# Patient Record
Sex: FEMALE | Race: OTHER | NOT HISPANIC OR LATINO | Employment: FULL TIME | URBAN - METROPOLITAN AREA
[De-identification: names, ages, dates, MRNs, and addresses within clinical notes are randomized per-mention and may not be internally consistent; named-entity substitution may affect disease eponyms.]

---

## 2022-07-24 ENCOUNTER — OFFICE VISIT (OUTPATIENT)
Dept: URGENT CARE | Facility: MEDICAL CENTER | Age: 37
End: 2022-07-24
Payer: COMMERCIAL

## 2022-07-24 VITALS
HEIGHT: 68 IN | HEART RATE: 83 BPM | OXYGEN SATURATION: 100 % | WEIGHT: 148 LBS | SYSTOLIC BLOOD PRESSURE: 156 MMHG | TEMPERATURE: 98.8 F | RESPIRATION RATE: 20 BRPM | BODY MASS INDEX: 22.43 KG/M2 | DIASTOLIC BLOOD PRESSURE: 96 MMHG

## 2022-07-24 DIAGNOSIS — Z88.1 ALLERGIC REACTION TO AMOXICILLIN/CLAVULANIC ACID: Primary | ICD-10-CM

## 2022-07-24 PROCEDURE — 99213 OFFICE O/P EST LOW 20 MIN: CPT | Performed by: PHYSICIAN ASSISTANT

## 2022-07-24 RX ORDER — PREDNISONE 10 MG/1
TABLET ORAL
Qty: 30 TABLET | Refills: 0 | Status: SHIPPED | OUTPATIENT
Start: 2022-07-24

## 2022-07-24 NOTE — PROGRESS NOTES
[facility]        NAME: Nelson Sanots is a 44 y o  female  : 1985    MRN: [id number]  DATE: 2022  TIME: 2:08 PM    Assessment and Plan   Allergic reaction to amoxicillin/clavulanic acid [Z88 1]  1  Allergic reaction to amoxicillin/clavulanic acid  predniSONE 10 mg tablet         Patient Instructions     Follow-up blood pressure check  Follow up with PCP as needed  Claritin 10 mg daily  Howard chart allergic to penicillin  Chief Complaint     Chief Complaint   Patient presents with    Rash     Patient started with a rash on her upper body on Friday  She was on amoxicillin nini  or a tooth issue         History of Present Illness       Patient was on amoxicillin for several days for a tooth infection  She stopped it when the rash started yesterday  The rash has continued and increases with a hot shower  Rash  This is a new problem  The current episode started yesterday  The problem has been gradually worsening since onset  The rash is diffuse  The problem is moderate  The rash is characterized by redness and itchiness  She was exposed to a new medication  Pertinent negatives include no anorexia, congestion, cough, decreased physical activity, decreased responsiveness, decreased sleep, drinking less, diarrhea, facial edema, fatigue, fever, itching, joint pain, rhinorrhea, shortness of breath, sore throat or vomiting  Past treatments include nothing  Review of Systems   Review of Systems   Constitutional: Negative for decreased responsiveness, fatigue and fever  HENT: Negative for congestion, rhinorrhea and sore throat  Respiratory: Negative for cough and shortness of breath  Gastrointestinal: Negative for anorexia, diarrhea and vomiting  Musculoskeletal: Negative for joint pain  Skin: Positive for rash  Negative for itching  All other systems reviewed and are negative          Current Medications       Current Outpatient Medications:     predniSONE 10 mg tablet, 5 x 4 days, 4 x 1, 3 x 1, 2 x 1, 1 x 1, Disp: 30 tablet, Rfl: 0    Current Allergies     Allergies as of 07/24/2022    (No Known Allergies)            The following portions of the patient's history were reviewed and updated as appropriate: allergies, current medications, past family history, past medical history, past social history, past surgical history and problem list      History reviewed  No pertinent past medical history  History reviewed  No pertinent surgical history  History reviewed  No pertinent family history  Medications have been verified  Objective   /96   Pulse 83   Temp 98 8 °F (37 1 °C)   Resp 20   Ht 5' 8" (1 727 m)   Wt 67 1 kg (148 lb)   LMP 07/06/2022   SpO2 100%   BMI 22 50 kg/m²   Patient's last menstrual period was 07/06/2022  Physical Exam     Physical Exam  Vitals and nursing note reviewed  Constitutional:       Appearance: Normal appearance  She is normal weight  Cardiovascular:      Rate and Rhythm: Normal rate and regular rhythm  Pulses: Normal pulses  Heart sounds: Normal heart sounds  Pulmonary:      Effort: Pulmonary effort is normal       Breath sounds: Normal breath sounds  Lymphadenopathy:      Cervical: No cervical adenopathy  Neurological:      Mental Status: She is alert           Widespread macular pruritic rash

## 2023-11-20 ENCOUNTER — OFFICE VISIT (OUTPATIENT)
Dept: URGENT CARE | Facility: MEDICAL CENTER | Age: 38
End: 2023-11-20
Payer: COMMERCIAL

## 2023-11-20 VITALS
DIASTOLIC BLOOD PRESSURE: 85 MMHG | OXYGEN SATURATION: 100 % | HEART RATE: 72 BPM | TEMPERATURE: 99.3 F | HEIGHT: 68 IN | BODY MASS INDEX: 22.5 KG/M2 | RESPIRATION RATE: 16 BRPM | SYSTOLIC BLOOD PRESSURE: 147 MMHG

## 2023-11-20 DIAGNOSIS — H69.93 DYSFUNCTION OF BOTH EUSTACHIAN TUBES: Primary | ICD-10-CM

## 2023-11-20 PROCEDURE — 99213 OFFICE O/P EST LOW 20 MIN: CPT | Performed by: FAMILY MEDICINE

## 2023-11-20 RX ORDER — FLUTICASONE PROPIONATE 50 MCG
2 SPRAY, SUSPENSION (ML) NASAL DAILY
Qty: 16 G | Refills: 0 | Status: SHIPPED | OUTPATIENT
Start: 2023-11-20

## 2023-11-20 RX ORDER — METOPROLOL SUCCINATE 50 MG/1
50 TABLET, EXTENDED RELEASE ORAL DAILY
COMMUNITY

## 2023-11-21 NOTE — PATIENT INSTRUCTIONS
No evidence of otitis media or cerumen impaction. I prescribed fluticasone nasal spray-2 sprays in each nostril once daily. After using Flonase for 5 to 7 days if there is no noticeable improvement, patient was given ENT referral.    Eustachian Tube Dysfunction   WHAT YOU NEED TO KNOW:   What is eustachian tube dysfunction (ETD)? ETD is a condition that prevents your eustachian tubes from opening properly. It can also cause them to become blocked. Eustachian tubes connect your middle ear to the back of your nose and throat. These tubes open and allow air to flow in and out when you sneeze, swallow, or yawn. What causes or increases my risk for ETD? ETD may be caused by swelling or buildup of mucus in your eustachian tubes. Pressure can build if you travel in an airplane or go scuba diving. Allergies, a cold, or a sinus infection can cause mucus to build up. The following can also increase your risk:  Smoking cigarettes    GERD, chronic sinus inflammation, or a tumor in your nose or throat    An immune system disorder    Sleeping on your stomach    In children, long-term use of a bottle, going to , or a condition such as a cleft palate    What are the signs and symptoms of ETD? Fullness or pressure in your ears    Muffled hearing, or a feeling you are hearing under water or have clogged ears    Pain in one or both ears    Ringing in your ears    Popping, crackling, or clicking feeling in your ears    Trouble keeping your balance    How is ETD diagnosed? ETD is most common in children younger than 5 years. Adults with ETD may have had it since childhood. ETD can sometimes begin in adulthood, usually because of certain medical conditions that have developed. Your healthcare provider will ask about your symptoms and when they began. He or she will examine your ears, your nose, and the back of your throat. He or she may also do a hearing test.  How is ETD treated?   ETD may get better on its own without any treatment. If it continues, you may need any of the following:  Swallow, yawn, or chew gum  to help open your eustachian tubes. Your healthcare provider may also recommend you blow with your mouth shut and your nostrils pinched closed. Air pressure devices  push air into your nose and eustachian tubes to help relieve air pressure in your ear. Treatment for allergies  such as decongestants, antihistamines, and nasal steroids may improve ETD. They may help decrease swelling of the eustachian tubes. A myringotomy  is surgery to make a hole in your eardrum. The hole relieves pressure and lets fluid drain from your ear. A pressure equalizing (PE) tube may be used to keep the hole open and to help drain fluid. Tuboplasty  is a procedure to widen your eustachian tubes. When should I call my doctor? Your symptoms do not improve or get worse. You have a fever. You have any hearing loss. You have questions or concerns about your condition or care. CARE AGREEMENT:   You have the right to help plan your care. Learn about your health condition and how it may be treated. Discuss treatment options with your healthcare providers to decide what care you want to receive. You always have the right to refuse treatment. The above information is an  only. It is not intended as medical advice for individual conditions or treatments. Talk to your doctor, nurse or pharmacist before following any medical regimen to see if it is safe and effective for you. © Copyright [employer]itch 2023 Information is for End User's use only and may not be sold, redistributed or otherwise used for commercial purposes.

## 2023-11-21 NOTE — PROGRESS NOTES
[facility]        NAME: Rafaela Cota is a 43 y.o. female  : 1985    MRN: [id number]  DATE: 2023  TIME: 8:03 PM    Assessment and Plan   Dysfunction of both eustachian tubes [H69.93]  1. Dysfunction of both eustachian tubes  fluticasone (FLONASE) 50 mcg/act nasal spray    Ambulatory Referral to Otolaryngology            Patient Instructions       Follow up with PCP in 3-5 days. Proceed to  ER if symptoms worsen. Chief Complaint     Chief Complaint   Patient presents with    Hearing Problem     Pt c/o pressure in both ears with muffled hearing (L>R) for the past week. Used ear wax softening drops and tried irrigation at home w/o relief         History of Present Illness       34-year-old female here today with complaint of pressure in both ear for the past week. Muffled hearing left greater than right. She thought she had earwax and has been using ear softening drops irrigating both with no relief. Denies any fever prior to denies any dizziness. Denies any discharge. She has had a previous history of cerumen impaction in the past.        Review of Systems   Review of Systems   HENT:  Positive for ear pain and hearing loss.           Current Medications       Current Outpatient Medications:     fluticasone (FLONASE) 50 mcg/act nasal spray, 2 sprays into each nostril daily, Disp: 16 g, Rfl: 0    metoprolol succinate (TOPROL-XL) 50 mg 24 hr tablet, Take 50 mg by mouth daily, Disp: , Rfl:     predniSONE 10 mg tablet, 5 x 4 days, 4 x 1, 3 x 1, 2 x 1, 1 x 1 (Patient not taking: Reported on 2023), Disp: 30 tablet, Rfl: 0    Current Allergies     Allergies as of 2023 - Reviewed 2023   Allergen Reaction Noted    Penicillins Hives 2023            The following portions of the patient's history were reviewed and updated as appropriate: allergies, current medications, past family history, past medical history, past social history, past surgical history and problem list.     Past Medical History:   Diagnosis Date    Hypertension        History reviewed. No pertinent surgical history. History reviewed. No pertinent family history. Medications have been verified. Objective   /85   Pulse 72   Temp 99.3 °F (37.4 °C) (Tympanic)   Resp 16   Ht 5' 8" (1.727 m)   LMP 10/28/2023 (Approximate)   SpO2 100%   BMI 22.50 kg/m²   Patient's last menstrual period was 10/28/2023 (approximate). Physical Exam     Physical Exam  Vitals and nursing note reviewed. Constitutional:       Appearance: Normal appearance. HENT:      Right Ear: Tympanic membrane and ear canal normal.      Left Ear: Tympanic membrane and ear canal normal.      Nose:      Comments: Erythematous hypertrophic turbinates right greater than left. Neurological:      Mental Status: She is alert.